# Patient Record
Sex: FEMALE | Race: WHITE
[De-identification: names, ages, dates, MRNs, and addresses within clinical notes are randomized per-mention and may not be internally consistent; named-entity substitution may affect disease eponyms.]

---

## 2018-09-27 ENCOUNTER — HOSPITAL ENCOUNTER (EMERGENCY)
Dept: HOSPITAL 74 - JER | Age: 83
Discharge: HOME | End: 2018-09-27
Payer: COMMERCIAL

## 2018-09-27 VITALS — BODY MASS INDEX: 32.9 KG/M2

## 2018-09-27 VITALS — TEMPERATURE: 98.2 F | HEART RATE: 87 BPM | SYSTOLIC BLOOD PRESSURE: 117 MMHG | DIASTOLIC BLOOD PRESSURE: 92 MMHG

## 2018-09-27 DIAGNOSIS — S42.145A: Primary | ICD-10-CM
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
## 2018-09-27 NOTE — PDOC
Attending Attestation





- Resident


Resident Name: Enzo Ross





- ED Attending Attestation


I have performed the following: I have examined & evaluated the patient, The 

case was reviewed & discussed with the resident, I agree w/resident's findings 

& plan, Exceptions are as noted





- HPI


HPI: 





09/27/18 14:26


The patient is a 88 year old female with a significant past medical history of 

pneumonia s/p outpatient managment who presents to the ER s/p fall earlier 

today. Patient states she tripped over a rug at home and sustained the fall on 

her left shoulder. Patient is now complaining of left shoulder soreness and 

pain. Patient denies any head trauma or loss of consciousness. Patient is not 

currently following up with ortho. No associated numbness/tingling/weakness, 

neck pain, back pain.





The patient denies chest pain, shortness of breath, dizziness, head trauma, 

nausea, vision changes, or headache. 





Allergies: Penicillins


Past surgical history: None reported. 


Social history: No reported alcohol, drug, or cigarette use.


PCP: Dr. Lyle at [facility]








- Physicial Exam


PE: 





09/27/18 16:12


GENERAL: The patient is awake, alert, and fully oriented, Nontoxic - in no 

acute distress.


HEAD: Normocephalic, atraumatic scalp


EYES: extraocular movements intact, sclera anicteric, conjunctiva clear.


ENT: Normal voice,  Moist mucous membranes.


NECK: Normal range of motion, supple


LUNGS: Breath sounds equal, clear to auscultation bilaterally.  No wheezes, no 

rhonchi, no rales.


HEART: Regular rate and rhythm, normal S1 and S2 without murmur, rub or gallop.


ABDOMEN: Soft, nontender, normoactive bowel sounds.  No guarding, no rebound.  

. No CVA tenderness


EXTREMITIES: mild ttp to L posterior shoulder, ROM limited by pain. 


BACK: No focal tenderness in the cervical, throacic, lumbar tenderness. 


NEUROLOGICAL: No facial assymetry, Normal speech, moving all 4 extremities 

spontaneously and symmetrically 


PSYCH: Normal mood, normal affect.


SKIN: Warm, Dry, normal turgor,











- Medical Decision Making





09/27/18 16:17


ddx: shoulder fx vs dislocation


09/27/18 16:25


xray neg for fx, but pt tstill has alot of pain


will obtain a CT of shoulder to r/o fx


pt declines any pain medications


09/27/18 18:53





CT noted for avuolsion fx of the glenoid


placed in a sling


will dc with pmd and ortho fu

## 2018-09-27 NOTE — PDOC
History of Present Illness





- General


Chief Complaint: Pain, Acute


Stated Complaint: FALL


Time Seen by Provider: 09/27/18 14:00





- History of Present Illness


Initial Comments: 





09/27/18 14:48


88F with no pmh presents to the ED after mechanical fall tripping on carpet and 

falling on left shoulder. She denies LOC or hitting her head. She endorses pain 

in the left shoulder, difficulty raising her arm due to the pain. but able to 

move her hand. No loss of sensation. 





Past History





- Past Medical History


Allergies/Adverse Reactions: 


 Allergies











Allergy/AdvReac Type Severity Reaction Status Date / Time


 


Penicillins Allergy   Verified 09/27/18 15:49











Home Medications: 


Ambulatory Orders





NK [No Known Home Medication]  10/10/15 








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





- Suicide/Smoking/Psychosocial Hx


Smoking History: Never smoked


Have you smoked in the past 12 months: No


Hx Alcohol Use: No


Drug/Substance Use Hx: No


Substance Use Type: None





**Review of Systems





- Review of Systems


Able to Perform ROS?: Yes


Constitutional: No: Symptoms Reported


HEENTM: No: Symptoms Reported


Respiratory: No: Symptoms reported


Cardiac (ROS): No: Symptoms Reported


ABD/GI: No: Symptoms Reported


: No: Symptoms Reported


Musculoskeletal: Yes: See HPI


Integumentary: No: Symptoms Reported


Neurological: No: Symptoms reported


All Other Systems: Reviewed and Negative





*Physical Exam





- Physical Exam


General Appearance: Yes: Nourished, Appropriately Dressed.  No: Apparent 

Distress


HEENT: positive: EOMI, KG, Normal ENT Inspection


Neck: positive: Trachea midline, Normal Thyroid, Supple.  negative: Tender, 

Rigid


Respiratory/Chest: positive: Lungs Clear, Normal Breath Sounds.  negative: 

Chest Tender, Respiratory Distress


Cardiovascular: positive: Regular Rhythm, Regular Rate, S1, S2


Gastrointestinal/Abdominal: positive: Normal Bowel Sounds, Flat, Soft.  negative

: Tender


Musculoskeletal: positive: Decreased Range of Motion (of the left arm. 

Difficulty abducting left arm but refered shoulder pain when sqeezing left 

hand. ).  negative: CVA Tenderness


Extremity: positive: Normal Capillary Refill.  negative: Normal Inspection, 

Normal Range of Motion (Loss of shoulder fullness. )


Neurologic: positive: Fully Oriented, Alert, Normal Mood/Affect, Normal Response





ED Treatment Course





- RADIOLOGY


Radiology Studies Ordered: 














 Category Date Time Status


 


 SHOULDER-LEFT [RAD] Stat Radiology  09/27/18 14:15 Ordered














Medical Decision Making





- Medical Decision Making





09/27/18 14:52


Shoulder dislocation vs fracture vs muscle spasm


 - shoulder xray pending


09/27/18 16:24


Xray negative. 


 - Will check with shoulder CT and UA





*DC/Admit/Observation/Transfer


Diagnosis at time of Disposition: 


 Shoulder fracture, left








- Discharge Dispostion


Disposition: HOME


Condition at time of disposition: Improved


Decision to Admit order: No





- Referrals


Referrals: 


Omar Whalen MD [Primary Care Provider] - 


Aiden Riojas MD [Staff Physician] - 





- Patient Instructions


Printed Discharge Instructions:  How to Use a Sling


Additional Instructions: 


Come back to the ER for any new, worsening or concerning symptom. Make an 

appointment with  Dr. Riojas as soon as you can. 





- Post Discharge Activity